# Patient Record
Sex: FEMALE | Race: WHITE | NOT HISPANIC OR LATINO | Employment: OTHER | URBAN - METROPOLITAN AREA
[De-identification: names, ages, dates, MRNs, and addresses within clinical notes are randomized per-mention and may not be internally consistent; named-entity substitution may affect disease eponyms.]

---

## 2018-06-14 ENCOUNTER — CONSULT (OUTPATIENT)
Dept: URBAN - METROPOLITAN AREA CLINIC 43 | Facility: CLINIC | Age: 48
End: 2018-06-14

## 2018-06-14 DIAGNOSIS — H02.834: ICD-10-CM

## 2018-06-14 DIAGNOSIS — H02.831: ICD-10-CM

## 2018-06-14 PROCEDURE — 99212 OFFICE O/P EST SF 10 MIN: CPT

## 2018-06-14 ASSESSMENT — VISUAL ACUITY
OS_PH: 20/40
OS_CC: 20/50
OD_CC: 20/30

## 2018-06-18 ENCOUNTER — CONSULT (OUTPATIENT)
Dept: URBAN - METROPOLITAN AREA CLINIC 43 | Facility: CLINIC | Age: 48
End: 2018-06-18

## 2018-06-18 DIAGNOSIS — H02.831: ICD-10-CM

## 2018-06-18 DIAGNOSIS — H02.834: ICD-10-CM

## 2018-06-18 PROCEDURE — 99211T TECH SERVICE

## 2018-06-18 PROCEDURE — 92082 INTERMEDIATE VISUAL FIELD XM: CPT

## 2018-08-02 ENCOUNTER — PREPPED CHART (OUTPATIENT)
Dept: URBAN - METROPOLITAN AREA CLINIC 43 | Facility: CLINIC | Age: 48
End: 2018-08-02

## 2018-08-21 ENCOUNTER — SURGERY/PROCEDURE (OUTPATIENT)
Dept: URBAN - METROPOLITAN AREA CLINIC 43 | Facility: CLINIC | Age: 48
End: 2018-08-21

## 2018-08-21 DIAGNOSIS — H02.834: ICD-10-CM

## 2018-08-21 DIAGNOSIS — H02.831: ICD-10-CM

## 2018-08-21 PROCEDURE — 15823 BLEPHARP UPR EYELID XCSV SKN: CPT

## 2018-08-22 ENCOUNTER — POST-OP (OUTPATIENT)
Dept: URBAN - METROPOLITAN AREA CLINIC 39 | Facility: CLINIC | Age: 48
End: 2018-08-22

## 2018-08-22 DIAGNOSIS — Z98.890: ICD-10-CM

## 2018-08-22 PROCEDURE — 99024 POSTOP FOLLOW-UP VISIT: CPT

## 2018-08-22 ASSESSMENT — VISUAL ACUITY
OS_PH: 20/40+2
OS_SC: 20/400
OD_SC: 20/400
OD_PH: 20/50-2

## 2018-08-28 ENCOUNTER — POST-OP (OUTPATIENT)
Dept: URBAN - METROPOLITAN AREA CLINIC 43 | Facility: CLINIC | Age: 48
End: 2018-08-28

## 2018-08-28 DIAGNOSIS — Z98.890: ICD-10-CM

## 2018-08-28 PROCEDURE — 99024 POSTOP FOLLOW-UP VISIT: CPT

## 2018-09-20 ENCOUNTER — POST-OP (OUTPATIENT)
Dept: URBAN - METROPOLITAN AREA CLINIC 43 | Facility: CLINIC | Age: 48
End: 2018-09-20

## 2018-09-20 DIAGNOSIS — Z98.890: ICD-10-CM

## 2018-09-20 PROCEDURE — 99024 POSTOP FOLLOW-UP VISIT: CPT

## 2018-09-20 ASSESSMENT — VISUAL ACUITY
OS_CC: 20/40-1
OD_CC: 20/30-2

## 2018-11-15 ENCOUNTER — CONSULT (OUTPATIENT)
Dept: URBAN - METROPOLITAN AREA CLINIC 43 | Facility: CLINIC | Age: 48
End: 2018-11-15

## 2018-11-15 DIAGNOSIS — L98.8: ICD-10-CM

## 2018-11-15 PROCEDURE — 64612D DYSPORT INJECTION

## 2018-11-29 ENCOUNTER — COSMETIC CONSULT (OUTPATIENT)
Dept: URBAN - METROPOLITAN AREA CLINIC 43 | Facility: CLINIC | Age: 48
End: 2018-11-29

## 2018-11-29 DIAGNOSIS — H02.832: ICD-10-CM

## 2018-11-29 DIAGNOSIS — H02.831: ICD-10-CM

## 2018-11-29 DIAGNOSIS — H02.835: ICD-10-CM

## 2018-11-29 DIAGNOSIS — H02.834: ICD-10-CM

## 2018-11-29 PROCEDURE — 99212 OFFICE O/P EST SF 10 MIN: CPT

## 2018-12-10 ENCOUNTER — NEW PATIENT COMPREHENSIVE (OUTPATIENT)
Dept: URBAN - METROPOLITAN AREA CLINIC 43 | Facility: CLINIC | Age: 48
End: 2018-12-10

## 2018-12-10 DIAGNOSIS — H52.13: ICD-10-CM

## 2018-12-10 DIAGNOSIS — H52.4: ICD-10-CM

## 2018-12-10 PROCEDURE — 92015 DETERMINE REFRACTIVE STATE: CPT

## 2018-12-10 PROCEDURE — 92014 COMPRE OPH EXAM EST PT 1/>: CPT

## 2018-12-10 ASSESSMENT — VISUAL ACUITY
OS_CC: 20/40-1
OU_CC: 20/20
OS_SC: 20/200
OU_SC: 20/200
OD_SC: 20/400
OS_SC: J1
OD_SC: J1
OU_SC: J1
OD_CC: 20/25+2

## 2018-12-10 ASSESSMENT — TONOMETRY
OD_IOP_MMHG: 14
OS_IOP_MMHG: 14

## 2021-08-03 ENCOUNTER — APPOINTMENT (RX ONLY)
Dept: URBAN - METROPOLITAN AREA CLINIC 134 | Facility: CLINIC | Age: 51
Setting detail: DERMATOLOGY
End: 2021-08-03

## 2021-08-03 DIAGNOSIS — D49.2 NEOPLASM OF UNSPECIFIED BEHAVIOR OF BONE, SOFT TISSUE, AND SKIN: ICD-10-CM

## 2021-08-03 DIAGNOSIS — L72.8 OTHER FOLLICULAR CYSTS OF THE SKIN AND SUBCUTANEOUS TISSUE: ICD-10-CM

## 2021-08-03 DIAGNOSIS — L30.4 ERYTHEMA INTERTRIGO: ICD-10-CM

## 2021-08-03 DIAGNOSIS — L82.1 OTHER SEBORRHEIC KERATOSIS: ICD-10-CM

## 2021-08-03 DIAGNOSIS — D18.0 HEMANGIOMA: ICD-10-CM

## 2021-08-03 DIAGNOSIS — Z85.828 PERSONAL HISTORY OF OTHER MALIGNANT NEOPLASM OF SKIN: ICD-10-CM

## 2021-08-03 DIAGNOSIS — L71.8 OTHER ROSACEA: ICD-10-CM

## 2021-08-03 DIAGNOSIS — L57.8 OTHER SKIN CHANGES DUE TO CHRONIC EXPOSURE TO NONIONIZING RADIATION: ICD-10-CM | Status: STABLE

## 2021-08-03 DIAGNOSIS — D22 MELANOCYTIC NEVI: ICD-10-CM

## 2021-08-03 DIAGNOSIS — L81.4 OTHER MELANIN HYPERPIGMENTATION: ICD-10-CM

## 2021-08-03 PROBLEM — D18.01 HEMANGIOMA OF SKIN AND SUBCUTANEOUS TISSUE: Status: ACTIVE | Noted: 2021-08-03

## 2021-08-03 PROBLEM — D22.5 MELANOCYTIC NEVI OF TRUNK: Status: ACTIVE | Noted: 2021-08-03

## 2021-08-03 PROCEDURE — ? BIOPSY BY SHAVE METHOD

## 2021-08-03 PROCEDURE — ? COUNSELING

## 2021-08-03 PROCEDURE — ? SUNSCREEN RECOMMENDATIONS

## 2021-08-03 PROCEDURE — 99204 OFFICE O/P NEW MOD 45 MIN: CPT | Mod: 25

## 2021-08-03 PROCEDURE — ? PRESCRIPTION

## 2021-08-03 PROCEDURE — ? PRESCRIPTION MEDICATION MANAGEMENT

## 2021-08-03 PROCEDURE — 11103 TANGNTL BX SKIN EA SEP/ADDL: CPT

## 2021-08-03 PROCEDURE — ? OTHER

## 2021-08-03 PROCEDURE — 11102 TANGNTL BX SKIN SINGLE LES: CPT

## 2021-08-03 RX ORDER — AZELAIC ACID 0.15 G/G
THIN LAYER GEL TOPICAL QD
Qty: 1 | Refills: 3 | Status: ERX | COMMUNITY
Start: 2021-08-03

## 2021-08-03 RX ADMIN — AZELAIC ACID THIN LAYER: 0.15 GEL TOPICAL at 00:00

## 2021-08-03 ASSESSMENT — LOCATION DETAILED DESCRIPTION DERM
LOCATION DETAILED: SUBXIPHOID
LOCATION DETAILED: LEFT MEDIAL MALAR CHEEK
LOCATION DETAILED: LOWER STERNUM
LOCATION DETAILED: LEFT PROXIMAL DORSAL FOREARM
LOCATION DETAILED: RIGHT DISTAL CALF
LOCATION DETAILED: LEFT ANTERIOR SHOULDER
LOCATION DETAILED: EPIGASTRIC SKIN
LOCATION DETAILED: RIGHT PROXIMAL DORSAL FOREARM
LOCATION DETAILED: RIGHT INFERIOR MEDIAL MALAR CHEEK
LOCATION DETAILED: RIGHT LATERAL SUPERIOR CHEST
LOCATION DETAILED: LEFT SUPERIOR UPPER BACK
LOCATION DETAILED: SUPERIOR MID FOREHEAD
LOCATION DETAILED: RIGHT SUPERIOR UPPER BACK
LOCATION DETAILED: INFERIOR THORACIC SPINE
LOCATION DETAILED: LEFT INFERIOR CENTRAL MALAR CHEEK
LOCATION DETAILED: RIGHT SUPERIOR MEDIAL UPPER BACK

## 2021-08-03 ASSESSMENT — LOCATION SIMPLE DESCRIPTION DERM
LOCATION SIMPLE: RIGHT UPPER BACK
LOCATION SIMPLE: LEFT UPPER BACK
LOCATION SIMPLE: LEFT FOREARM
LOCATION SIMPLE: CHEST
LOCATION SIMPLE: LEFT SHOULDER
LOCATION SIMPLE: SUPERIOR FOREHEAD
LOCATION SIMPLE: UPPER BACK
LOCATION SIMPLE: RIGHT FOREARM
LOCATION SIMPLE: ABDOMEN
LOCATION SIMPLE: LEFT CHEEK
LOCATION SIMPLE: RIGHT CHEEK
LOCATION SIMPLE: RIGHT CALF

## 2021-08-03 ASSESSMENT — LOCATION ZONE DERM
LOCATION ZONE: FACE
LOCATION ZONE: TRUNK
LOCATION ZONE: ARM
LOCATION ZONE: LEG

## 2021-08-03 NOTE — PROCEDURE: BIOPSY BY SHAVE METHOD
Body Location Override (Optional - Billing Will Still Be Based On Selected Body Map Location If Applicable): left scapula
Detail Level: Simple
Depth Of Biopsy: dermis
Was A Bandage Applied: Yes
Size Of Lesion In Cm: 0.7
X Size Of Lesion In Cm: 0
Biopsy Type: H and E
Biopsy Method: Personna blade
Anesthesia Type: 1% Xylocaine with [id number] epinephrine and sodium bicarbonate
Anesthesia Volume In Cc (Will Not Render If 0): 0.3
Hemostasis: Drysol and Electrocautery
Wound Care: Vaseline
Dressing: bandage
Destruction After The Procedure: No
Type Of Destruction Used: Curettage
Curettage Text: The wound bed was treated with curettage after the biopsy was performed.
Cryotherapy Text: The wound bed was treated with cryotherapy after the biopsy was performed.
Electrodesiccation Text: The wound bed was treated with electrodesiccation after the biopsy was performed.
Electrodesiccation And Curettage Text: The wound bed was treated with electrodesiccation and curettage after the biopsy was performed.
Silver Nitrate Text: The wound bed was treated with silver nitrate after the biopsy was performed.
Lab: [id number] [facility]
Consent: The provider's intent is to obtain a tissue sample solely for diagnostic purposes. Written consent was obtained and risks were reviewed including but not limited to scarring, infection, bleeding, scabbing, incomplete removal, nerve damage and allergy to anesthesia.
Post-Care Instructions: I reviewed with the patient in detail post-care instructions. Patient is to keep the biopsy site dry overnight, and then apply vaseline twice daily until healed.
Notification Instructions: Patient will be notified of biopsy results. However, patient instructed to call the office if not contacted within 2 weeks.
Billing Type: [employer]
Information: Selecting Yes will display possible errors in your note based on the variables you have selected. This validation is only offered as a suggestion for you. PLEASE NOTE THAT THE VALIDATION TEXT WILL BE REMOVED WHEN YOU FINALIZE YOUR NOTE. IF YOU WANT TO FAX A PRELIMINARY NOTE YOU WILL NEED TO TOGGLE THIS TO 'NO' IF YOU DO NOT WANT IT IN YOUR FAXED NOTE.
Body Location Override (Optional - Billing Will Still Be Based On Selected Body Map Location If Applicable): right mid paraspinal
Size Of Lesion In Cm: 0.8
Lab: [facility]
Lab Facility: [id number]
Billing Type: Third-Party Bill
Body Location Override (Optional - Billing Will Still Be Based On Selected Body Map Location If Applicable): right calf
Lab: [id number]
Lab Facility: [id number]

## 2021-08-03 NOTE — PROCEDURE: PRESCRIPTION MEDICATION MANAGEMENT
Render In Strict Bullet Format?: No
Detail Level: Zone
Plan: Zeabsorb AF powder to apply daily.
Initiate Treatment: Finacea gel to apply bid to face, cetaphil redness relief moisturizer, daily sunscreen, cetaphil face wash

## 2021-08-03 NOTE — PROCEDURE: OTHER
Other (Free Text): Has had multiple ED&Câs in the past
Render Risk Assessment In Note?: no
Detail Level: Zone
Note Text (......Xxx Chief Complaint.): This diagnosis correlates with the